# Patient Record
Sex: FEMALE | Race: BLACK OR AFRICAN AMERICAN | NOT HISPANIC OR LATINO | URBAN - METROPOLITAN AREA
[De-identification: names, ages, dates, MRNs, and addresses within clinical notes are randomized per-mention and may not be internally consistent; named-entity substitution may affect disease eponyms.]

---
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
## 2021-09-26 ENCOUNTER — HEALTH MAINTENANCE LETTER (OUTPATIENT)
Age: 21
End: 2021-09-26

## 2022-01-15 ENCOUNTER — HEALTH MAINTENANCE LETTER (OUTPATIENT)
Age: 22
End: 2022-01-15

## 2022-07-10 NOTE — NURSING NOTE
"Chief Complaint   Patient presents with     Well Child     Health Maintenance       Initial /72  Pulse 84  Temp 98.3  F (36.8  C) (Oral)  Ht 5' 5.35\" (1.66 m)  Wt 126 lb 6.4 oz (57.3 kg)  LMP 03/27/2017 (Approximate)  BMI 20.81 kg/m2 Estimated body mass index is 20.81 kg/(m^2) as calculated from the following:    Height as of this encounter: 5' 5.35\" (1.66 m).    Weight as of this encounter: 126 lb 6.4 oz (57.3 kg).  Medication Reconciliation: complete     Antonia Hart      " firm

## 2022-10-24 NOTE — NURSING NOTE
"Chief Complaint   Patient presents with     Well Child     Health Maintenance       Initial /72  Pulse 84  Temp 98.3  F (36.8  C) (Oral)  Ht 5' 5.35\" (1.66 m)  Wt 126 lb 6.4 oz (57.3 kg)  LMP 03/27/2017 (Approximate)  BMI 20.81 kg/m2 Estimated body mass index is 20.81 kg/(m^2) as calculated from the following:    Height as of this encounter: 5' 5.35\" (1.66 m).    Weight as of this encounter: 126 lb 6.4 oz (57.3 kg).  Medication Reconciliation: complete     Antonia Hart      Application of Fluoride Varnish    Contraindications: None present- fluoride varnish applied    Dental Fluoride Varnish and Post-Treatment Instructions: Reviewed with patient   used: Yes    Dental Fluoride applied to teeth by: Antonia Hart MA  Fluoride was well tolerated    Antonia Hart MA    " Posterior Auricular Interpolation Flap Text: A decision was made to reconstruct the defect utilizing an interpolation axial flap and a staged reconstruction.  A telfa template was made of the defect.  This telfa template was then used to outline the posterior auricular interpolation flap.  The donor area for the pedicle flap was then injected with anesthesia.  The flap was excised through the skin and subcutaneous tissue down to the layer of the underlying musculature.  The pedicle flap was carefully excised within this deep plane to maintain its blood supply.  The edges of the donor site were undermined.   The donor site was closed in a primary fashion.  The pedicle was then rotated into position and sutured.  Once the tube was sutured into place, adequate blood supply was confirmed with blanching and refill.  The pedicle was then wrapped with xeroform gauze and dressed appropriately with a telfa and gauze bandage to ensure continued blood supply and protect the attached pedicle.

## 2022-10-25 ENCOUNTER — VIRTUAL VISIT (OUTPATIENT)
Dept: FAMILY MEDICINE | Facility: CLINIC | Age: 22
End: 2022-10-25
Payer: COMMERCIAL

## 2022-10-25 DIAGNOSIS — Z11.3 SCREENING FOR STDS (SEXUALLY TRANSMITTED DISEASES): ICD-10-CM

## 2022-10-25 DIAGNOSIS — Z12.4 CERVICAL CANCER SCREENING: ICD-10-CM

## 2022-10-25 DIAGNOSIS — J11.1 INFLUENZA: Primary | ICD-10-CM

## 2022-10-25 DIAGNOSIS — Z11.59 NEED FOR HEPATITIS C SCREENING TEST: ICD-10-CM

## 2022-10-25 PROCEDURE — 99213 OFFICE O/P EST LOW 20 MIN: CPT | Mod: 95 | Performed by: FAMILY MEDICINE

## 2022-10-25 RX ORDER — OSELTAMIVIR PHOSPHATE 75 MG/1
75 CAPSULE ORAL 2 TIMES DAILY
Qty: 10 CAPSULE | Refills: 0 | Status: SHIPPED | OUTPATIENT
Start: 2022-10-25 | End: 2022-10-30

## 2022-10-25 ASSESSMENT — PATIENT HEALTH QUESTIONNAIRE - PHQ9
SUM OF ALL RESPONSES TO PHQ QUESTIONS 1-9: 3
10. IF YOU CHECKED OFF ANY PROBLEMS, HOW DIFFICULT HAVE THESE PROBLEMS MADE IT FOR YOU TO DO YOUR WORK, TAKE CARE OF THINGS AT HOME, OR GET ALONG WITH OTHER PEOPLE: SOMEWHAT DIFFICULT
SUM OF ALL RESPONSES TO PHQ QUESTIONS 1-9: 3

## 2022-10-25 NOTE — LETTER
October 25, 2022      Rose Garsia  [address] [address]        To Whom It May Concern:    Rose Garsia  was seen on 10/25/2022.  Please excuse her until 10/31/2022 due to illness. Please contact us if you have any questions or concerns.      Sincerely,        Amos Motta, DO

## 2022-10-25 NOTE — PROGRESS NOTES
Rose is a 22 year old who is being evaluated via a billable video visit.      How would you like to obtain your AVS? MyChart  If the video visit is dropped, the invitation should be resent by: Text to cell phone: [phone]  Will anyone else be joining your video visit? No      Assessment & Plan     Influenza  Clinical diagnosis based on close contact with brother who is positive for influenza. Discussed that symptoms should improve over the course of the next week with fevers improving over the next few days. Encouraged rest, pushing fluids, and acetaminophen/NSAIDs for fever/body aches/headaches. Can also start Tamiflu. Once afebrile for 24 hours with antipyretic therapy, okay to return to work. If fevers not improving over the next several days or if any worsening of respiratory symptoms, return to clinic for evaluation.  - oseltamivir (TAMIFLU) 75 MG capsule; Take 1 capsule (75 mg) by mouth 2 times daily for 5 days          Return in about 1 week (around 11/1/2022) for follow up if symptoms not improving.    Amos Motta, [facility]   Rose is a 22 year old presenting for the following health issues:  Flu Symptoms      History of Present Illness       Reason for visit:  Flu symptoms    She eats 2-3 servings of fruits and vegetables daily.She consumes 3 sweetened beverage(s) daily.She exercises with enough effort to increase her heart rate 9 or less minutes per day.  She exercises with enough effort to increase her heart rate 3 or less days per week.   She is taking medications regularly.    Today's PHQ-9         PHQ-9 Total Score: 3    PHQ-9 Q9 Thoughts of better off dead/self-harm past 2 weeks :   Not at all    How difficult have these problems made it for you to do your work, take care of things at home, or get along with other people: Somewhat difficult         Acute Illness  Acute illness concerns: Fever- brother was diagnosed with flu   Onset/Duration: x 1  day ago  Symptoms:  Fever: YES, 101  Chills/Sweats: YES  Headache (location?): YES  Sinus Pressure: No  Conjunctivitis:  No  Ear Pain: no  Rhinorrhea: YES  Congestion: YES  Sore Throat: YES- a little bit   Cough: YES  Wheeze: YES  Decreased Appetite: YES  Nausea: No  Vomiting: No  Diarrhea: No  Dysuria/Freq.: No  Dysuria or Hematuria: No  Fatigue/Achiness: YES  Sick/Strep Exposure: YES  Therapies tried and outcome: Tylenol      Review of Systems   Constitutional, HEENT, cardiovascular, pulmonary, gi and gu systems are negative, except as otherwise noted.      Objective           Vitals:  No vitals were obtained today due to virtual visit.    Physical Exam   GENERAL: Healthy, alert and no distress  EYES: Eyes grossly normal to inspection.  No discharge or erythema, or obvious scleral/conjunctival abnormalities.  RESP: No audible wheeze, cough, or visible cyanosis.  No visible retractions or increased work of breathing.    SKIN: Visible skin clear. No significant rash, abnormal pigmentation or lesions.  NEURO: Cranial nerves grossly intact.  Mentation and speech appropriate for age.  PSYCH: Mentation appears normal, affect normal/bright, judgement and insight intact, normal speech and appearance well-groomed.          Video-Visit Details    Video Start Time: 2:09 PM    Type of service:  Video Visit    Video End Time: 2:20 PM    Originating Location (pt. Location): Home    Distant Location (provider location):  On-site    Platform used for Video Visit: Gomez

## 2023-03-06 ENCOUNTER — E-VISIT (OUTPATIENT)
Dept: FAMILY MEDICINE | Facility: CLINIC | Age: 23
End: 2023-03-06
Payer: COMMERCIAL

## 2023-03-06 DIAGNOSIS — R05.1 ACUTE COUGH: Primary | ICD-10-CM

## 2023-03-06 DIAGNOSIS — Z11.3 SCREEN FOR STD (SEXUALLY TRANSMITTED DISEASE): ICD-10-CM

## 2023-03-06 PROCEDURE — 99207 PR NO CHARGE LOS: CPT | Performed by: FAMILY MEDICINE

## 2023-03-06 NOTE — LETTER
March 7, 2023      Rose Garsia  [address] [address]        To Whom It May Concern:    Rose Garsia was seen in our clinic. Please excuse her from work on 3/6/2023 and 3/7/2023 due to illness.      Sincerely,        Amos Motta, DO

## 2023-03-07 ENCOUNTER — LAB (OUTPATIENT)
Dept: FAMILY MEDICINE | Facility: CLINIC | Age: 23
End: 2023-03-07
Attending: FAMILY MEDICINE
Payer: COMMERCIAL

## 2023-03-07 ENCOUNTER — APPOINTMENT (OUTPATIENT)
Dept: LAB | Facility: CLINIC | Age: 23
End: 2023-03-07
Payer: COMMERCIAL

## 2023-03-07 DIAGNOSIS — R05.1 ACUTE COUGH: ICD-10-CM

## 2023-03-07 DIAGNOSIS — N76.0 BACTERIAL VAGINOSIS: Primary | ICD-10-CM

## 2023-03-07 DIAGNOSIS — B96.89 BACTERIAL VAGINOSIS: Primary | ICD-10-CM

## 2023-03-07 LAB
CLUE CELLS: PRESENT
FLUAV AG SPEC QL IA: NEGATIVE
FLUBV AG SPEC QL IA: NEGATIVE
TRICHOMONAS, WET PREP: ABNORMAL
WBC'S/HIGH POWER FIELD, WET PREP: ABNORMAL
YEAST, WET PREP: ABNORMAL

## 2023-03-07 PROCEDURE — 87591 N.GONORRHOEAE DNA AMP PROB: CPT | Performed by: FAMILY MEDICINE

## 2023-03-07 PROCEDURE — 36415 COLL VENOUS BLD VENIPUNCTURE: CPT | Performed by: FAMILY MEDICINE

## 2023-03-07 PROCEDURE — 87210 SMEAR WET MOUNT SALINE/INK: CPT | Performed by: FAMILY MEDICINE

## 2023-03-07 PROCEDURE — 87491 CHLMYD TRACH DNA AMP PROBE: CPT | Performed by: FAMILY MEDICINE

## 2023-03-07 PROCEDURE — 86780 TREPONEMA PALLIDUM: CPT | Performed by: FAMILY MEDICINE

## 2023-03-07 PROCEDURE — 86803 HEPATITIS C AB TEST: CPT | Performed by: FAMILY MEDICINE

## 2023-03-07 PROCEDURE — 87804 INFLUENZA ASSAY W/OPTIC: CPT | Performed by: FAMILY MEDICINE

## 2023-03-07 PROCEDURE — 87340 HEPATITIS B SURFACE AG IA: CPT | Performed by: FAMILY MEDICINE

## 2023-03-07 PROCEDURE — 87389 HIV-1 AG W/HIV-1&-2 AB AG IA: CPT | Performed by: FAMILY MEDICINE

## 2023-03-07 RX ORDER — METRONIDAZOLE 500 MG/1
500 TABLET ORAL 2 TIMES DAILY
Qty: 14 TABLET | Refills: 0 | Status: SHIPPED | OUTPATIENT
Start: 2023-03-07 | End: 2023-03-14

## 2023-03-07 NOTE — ADDENDUM NOTE
Addended by: PENNY DELUNA on: 3/7/2023 09:08 AM     Modules accepted: Orders, Level of Service, SmartSet

## 2023-03-07 NOTE — PROGRESS NOTES
FLU Swab per Kalia.   Chief Complaint   Patient presents with     Swab     For Influenza, Patient declined COVID, due to the fact at home test was negative.   ONLY FLU SWAB COLLECTED at Patient's request.

## 2023-03-08 LAB
C TRACH DNA SPEC QL NAA+PROBE: NEGATIVE
HBV SURFACE AG SERPL QL IA: NONREACTIVE
HCV AB SERPL QL IA: NONREACTIVE
HIV 1+2 AB+HIV1 P24 AG SERPL QL IA: NONREACTIVE
N GONORRHOEA DNA SPEC QL NAA+PROBE: NEGATIVE
T PALLIDUM AB SER QL: NONREACTIVE

## 2023-04-23 ENCOUNTER — HEALTH MAINTENANCE LETTER (OUTPATIENT)
Age: 23
End: 2023-04-23
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
## 2024-06-14 NOTE — PROGRESS NOTES
Clinic Care Coordination Contact  [facility]/Voicemail       Clinical Data: Care Coordinator Outreach    Outreach attempted x 1.  Left message on patient's home voicemail with call back information and requested return call.    CC SW attempted to contact pt on cell phone listed but voicemail states it was Poncho's phone. No message left.     Plan: Care Coordinator will try to reach patient again in 1-2 business days.    CB Taylor, [facility]  Clinic Care Coordinator  [facility]  [phone]  [email or web address]   [email or web address]

## 2024-06-29 ENCOUNTER — HEALTH MAINTENANCE LETTER (OUTPATIENT)
Age: 24
End: 2024-06-29

## 2024-07-31 NOTE — PROGRESS NOTES
[facility], [place]   Psychiatric Progress Note      Impression:   This patient is a 16 year old -American female with a past psychiatric history of depression, anxiety, and bipolar disorder who presents with SI.    Significant symptoms include SI, irritable, depressed, mood lability, poor frustration tolerance and impulsive.    We are evaluating and adjusting medications (if indicated) to target patient's symptoms and working with the patient on therapeutic skill building.           Diagnoses and Plan:     Principal Diagnosis: MDD, recurrent, moderate with anxious distress.  R/o unspecified anxiety disorder.    Unit: 7AE  Attending: Tracee  Medications: risks/benefits discussed with mother  -  Zoloft 100 mg qHS to target mood/anxiety.     -  Abilify 2.5 mg qHS to target mood lability.  - Evaluate effectiveness of med regimen after sufficient period of med compliance.  Compliance will need to be closely monitored after discharge.  Laboratory/Imaging:  - Upreg neg, UDS neg, COMP wnl, TSH wnl, lipids wnl, CBC wnl except for low hemoglobin (Ferritin and iron also low) and Vitamin D L, supplementing    Consults:  - Peds for anemia  Assessment/Plan:    Iron Deficiency Anemia  - Iron deficiency is the most common cause of microcytic anemia. Iron deficiency may develop due to inadequate iron intake, blood loss, or inability to absorb iron. According to the medical records, Rose is historically a picky eater so it is likely that iron deficiency has developed secondary to inadequate intake. She denies any history of blood loss and dose not have any medical conditions such as celiac disease that disrupt the body's ability to absorb iron. Iron supplementation is recommended at this time.  - ferrous sulfate (Iron) 325 mg PO twice daily.  - Potential GI side effects including stomach upset and constipation discussed.  - Miralax 17 g PO once daily also prescribed to prevent constipation  Stable  Continue Wellbutrin as prescribed  Keep appt with Psych for follow up     associated with iron and other psychotropic medications that Rose is currently taking.    - Follow up with PCP in 8-12 weeks to recheck CBC & iron studies to determine adequacy of iron supplementation.    Patient will be treated in therapeutic milieu with appropriate individual and group therapies as described.  Family Assessment completed    Secondary psychiatric diagnoses of concern this admission:  Deferred    Medical diagnoses to be addressed this admission:    Iron deficiency anemia - Ferrous sulfate 325 mg BID  Constipation - Miralax 17 g daily  Vit D deficiency - supplementation    Relevant psychosocial stressors: family dynamics, peers and school    Legal Status: Voluntary    Safety Assessment:    Checks: Status 15  Precautions: Suicide  Pt has not required locked seclusion or restraints in the past 24 hours to maintain safety, please refer to RN documentation for further details.    The risks, benefits, alternatives and side effects have been discussed and are understood by the patient and other caregivers.    Anticipated Disposition/Discharge Date: early next week  Target symptoms to stabilize: SI, irritable, depressed, mood lability, poor frustration tolerance and impulsive  Target disposition: home and [facility], [place]    Attestation:  Patient has been seen and evaluated by me,  Gabe Easley DO          Interim History:   The patient's care was discussed with the treatment team and chart notes were reviewed.    Side effects to medication: denies  Sleep: slept through the night  Intake: eating/drinking without difficulty  Groups: attending groups and participating  Peer interactions: gets along well with peers    Patient reports feeling anxious; worried about mother who has epilepsy.  She feels guilty for stressing mother.  Patient feels safe in hospital; denies SI or SIB thoughts since admission.  She has been cooperative; attending groups and participating.  Appears to struggle with managing stress she is  experiencing in her life.  Patient agrees to be compliant with medications after discharge.  She agrees that school is overwhelming for her; struggling with grades and bullying.    The 10 point Review of Systems is negative other than noted in the HPI         Medications:       ferrous sulfate  325 mg Oral BID     polyethylene glycol  17 g Oral Daily     ARIPiprazole  2.5 mg Oral At Bedtime     influenza quadrivalent (PF) vacc age 3 yrs and older  0.5 mL Intramuscular Prior to discharge     sertraline  100 mg Oral At Bedtime             Allergies:   No Known Allergies         Psychiatric Examination:   /70 mmHg  Pulse 74  Temp(Src) 98.8  F (37.1  C) (Oral)  Resp 16  SpO2 100%  Weight is 0 lbs 0 oz  There is no height or weight on file to calculate BMI.    Appearance:  awake, alert and adequately groomed  Attitude:  cooperative  Eye Contact:  good  Mood:  better  Affect:  restricted range  Speech:  clear, coherent  Psychomotor Behavior:  no evidence of tardive dyskinesia, dystonia, or tics and intact station, gait and muscle tone  Thought Process:  logical and goal oriented  Associations:  no loose associations  Thought Content:  no evidence of suicidal ideation or homicidal ideation and no evidence of psychotic thought  Insight:  limited  Judgment:  intact  Oriented to:  time, person, and place  Attention Span and Concentration:  fair  Recent and Remote Memory:  intact  Language: Able to name objects  Fund of Knowledge: appropriate  Muscle Strength and Tone: normal  Gait and Station: Normal         Labs:   No results found for this or any previous visit (from the past 24 hour(s)).

## 2025-07-13 ENCOUNTER — HEALTH MAINTENANCE LETTER (OUTPATIENT)
Age: 25
End: 2025-07-13